# Patient Record
Sex: FEMALE | Race: WHITE | NOT HISPANIC OR LATINO | URBAN - METROPOLITAN AREA
[De-identification: names, ages, dates, MRNs, and addresses within clinical notes are randomized per-mention and may not be internally consistent; named-entity substitution may affect disease eponyms.]

---

## 2022-03-25 ENCOUNTER — NEW PATIENT (OUTPATIENT)
Dept: URBAN - METROPOLITAN AREA CLINIC 49 | Facility: CLINIC | Age: 27
End: 2022-03-25

## 2022-03-25 DIAGNOSIS — Z01.01: ICD-10-CM

## 2022-03-25 DIAGNOSIS — H52.4: ICD-10-CM

## 2022-03-25 PROCEDURE — 92015 DETERMINE REFRACTIVE STATE: CPT

## 2022-03-25 PROCEDURE — 92004 COMPRE OPH EXAM NEW PT 1/>: CPT

## 2022-03-25 ASSESSMENT — VISUAL ACUITY
OS_SC: J1
OD_SC: J1
OS_SC: CF 5FT
OU_SC: 20/400
OU_SC: J1
OD_SC: CF 3FT
OU_CC: J1+
OU_CC: 20/20
OS_CC: 20/20-1
OD_CC: 20/20-1

## 2022-03-25 ASSESSMENT — TONOMETRY
OD_IOP_MMHG: 13
OS_IOP_MMHG: 14

## 2022-03-25 NOTE — PATIENT DISCUSSION
Patient states she wears Acuvue 2 -4.50 OU. Provided patient with trial contact lens of Acuvue Oasys. Discussed with patient to bring her previous contact information to follow up appointment. Okay to schedule in follow up spot.

## 2022-04-12 ENCOUNTER — ESTABLISHED PATIENT (OUTPATIENT)
Dept: URBAN - METROPOLITAN AREA CLINIC 52 | Facility: CLINIC | Age: 27
End: 2022-04-12

## 2022-04-12 DIAGNOSIS — Z97.3: ICD-10-CM

## 2022-04-12 DIAGNOSIS — H52.13: ICD-10-CM

## 2022-04-12 PROCEDURE — 92310-3E ESTABLISHED CL PATIENT MULTIFOCAL AND/OR MONOVISION SOFT LENS EVALUATION

## 2022-04-12 ASSESSMENT — VISUAL ACUITY
OS_CC: 20/25
OD_CC: 20/25+2
OU_CC: 20/20+1

## 2022-04-12 NOTE — PATIENT DISCUSSION
Patient was given trial of Acuvue oasys 2 week -5.00. She will call back and let us know if she wants to finalize trials.